# Patient Record
Sex: MALE | Race: WHITE | NOT HISPANIC OR LATINO | Employment: UNEMPLOYED | URBAN - METROPOLITAN AREA
[De-identification: names, ages, dates, MRNs, and addresses within clinical notes are randomized per-mention and may not be internally consistent; named-entity substitution may affect disease eponyms.]

---

## 2020-04-29 ENCOUNTER — HISTORICAL (OUTPATIENT)
Dept: ADMINISTRATIVE | Facility: HOSPITAL | Age: 30
End: 2020-04-29

## 2020-04-29 LAB — SARS-COV-2 RNA RESP QL NAA+PROBE: NOT DETECTED

## 2020-06-25 ENCOUNTER — HISTORICAL (OUTPATIENT)
Dept: ADMINISTRATIVE | Facility: HOSPITAL | Age: 30
End: 2020-06-25

## 2020-07-19 PROBLEM — F32.A FATIGUE DUE TO DEPRESSION: Status: ACTIVE | Noted: 2020-07-19

## 2020-07-19 PROBLEM — R53.83 FATIGUE DUE TO DEPRESSION: Status: ACTIVE | Noted: 2020-07-19

## 2020-07-19 PROBLEM — R45.851 SUICIDAL IDEATION: Status: ACTIVE | Noted: 2020-07-19

## 2020-07-19 PROBLEM — Z13.9 ENCOUNTER FOR MEDICAL SCREENING EXAMINATION: Status: ACTIVE | Noted: 2020-07-19

## 2020-07-22 PROBLEM — R45.851 SUICIDAL IDEATION: Status: RESOLVED | Noted: 2020-07-19 | Resolved: 2020-07-22

## 2020-07-22 PROBLEM — F32.A FATIGUE DUE TO DEPRESSION: Status: RESOLVED | Noted: 2020-07-19 | Resolved: 2020-07-22

## 2020-07-22 PROBLEM — R53.83 FATIGUE DUE TO DEPRESSION: Status: RESOLVED | Noted: 2020-07-19 | Resolved: 2020-07-22

## 2020-07-22 PROBLEM — Z13.9 ENCOUNTER FOR MEDICAL SCREENING EXAMINATION: Status: RESOLVED | Noted: 2020-07-19 | Resolved: 2020-07-22

## 2022-01-04 ENCOUNTER — HISTORICAL (OUTPATIENT)
Dept: ADMINISTRATIVE | Facility: HOSPITAL | Age: 32
End: 2022-01-04

## 2022-01-04 LAB
INFLUENZA A ANTIGEN, POC: NEGATIVE
INFLUENZA B ANTIGEN, POC: NEGATIVE
SARS-COV-2 RNA RESP QL NAA+PROBE: POSITIVE

## 2022-04-10 ENCOUNTER — HISTORICAL (OUTPATIENT)
Dept: ADMINISTRATIVE | Facility: HOSPITAL | Age: 32
End: 2022-04-10
Payer: COMMERCIAL

## 2022-04-26 VITALS
OXYGEN SATURATION: 98 % | HEIGHT: 66 IN | DIASTOLIC BLOOD PRESSURE: 83 MMHG | BODY MASS INDEX: 37.03 KG/M2 | SYSTOLIC BLOOD PRESSURE: 153 MMHG | WEIGHT: 230.38 LBS

## 2022-05-04 NOTE — HISTORICAL [facility] CERNER
This is a historical note converted from [employer]. Formatting and pictures may have been removed.  Please reference [employer] for original formatting and attached multimedia. OPERATIVE REPORT  ?  DATE: 4/29/2020  ?  ASSISTANT: None  ?  PREOPERATIVE DIAGNOSIS:  1. ?Left clavicle fracture, comminuted  ?  POSTOPERATIVE DIAGNOSIS:  Same  ?  PROCEDURES:  1. ?Open reduction internal fixation of left clavicle fracture  ?  ANESTHESIA:  General anesthesia with supraclavicular nerve block  ?  BLOOD LOSS:  Less than?30 cc  ?  DVT PROPHYLAXIS:  Aspirin twice daily for 2 weeks  ?  INSTRUMENTATION:  Arthrex clavicle plate  ?  PROCEDURE IN DETAIL:  ?  Open reduction internal fixation of clavicle fracture  ?  Patient was brought into the room and place on the operative table in beach chair position. The patient?s operative limb was prepped and draped in normal sterile fashion. A time-out was performed to confirm procedure, operative limb, allergies, and antibiotics.?  ?  An incision was made superiorly over the clavicle. ?Careful soft tissue dissection was done?to visualize the fracture. ?The fracture was visualized and reduced. ?There was a butterfly fragment and?two?2.7 mm lag screws were placed. ?Then correct size plate was chosen and placed superiorly on the clavicle. Next the drill was used and a depth gauge and appropriate screws were placed. Placement was confirmed using fluoroscopy. Wound was then thoroughly irrigated. Skin was closed with 0 vicryl, then 2-0 vicryl and lastly 4-0 nylon. Then a sterile dressing was placed.?  ?  The patient tolerated procedure well without any complications.